# Patient Record
Sex: MALE | Race: WHITE | URBAN - METROPOLITAN AREA
[De-identification: names, ages, dates, MRNs, and addresses within clinical notes are randomized per-mention and may not be internally consistent; named-entity substitution may affect disease eponyms.]

---

## 2021-08-12 NOTE — PATIENT DISCUSSION
[facility] Visit New Patient Note    REFERRING PROVIDER: Dr. Eliana Ruelas    CHIEF COMPLAINT: liver cysts    HPI: This is a 54 y.o. Black or  female referred for evaluation of liver cysts. Has been seen in past by me for same issue. Seen once 2/2015. She had recent u/s that showed stable liver cysts, normal size liver. Her lft's are completely normal. She has abd pain but does have GERD. Had EGD and evaluation with GI in past. EGD was normal. She is scheduled for repeat soon and f/u with GI. She is not taking Nexium daily, on prn. GI advised daily PPI as her abd pain resolved with this in past.     Denies symptoms of hepatic decompensation including jaundice, dark urine, hematemesis, melena, slowed mentation, abdominal distention, or lower extremity edema.     Lab Results   Component Value Date    ALT 15 05/25/2019    AST 21 05/25/2019    ALKPHOS 88 05/25/2019    BILITOT 0.3 05/25/2019    ALBUMIN 3.7 05/25/2019    INR 0.9 06/15/2011     04/15/2019         Review of patient's allergies indicates:   Allergen Reactions    Ibuprofen Rash         Current Outpatient Medications on File Prior to Visit   Medication Sig Dispense Refill    ascorbic acid, vitamin C, 500 mg Cap Take by mouth once daily.       cetirizine (ZYRTEC) 5 MG tablet Take 5 mg by mouth once daily.        esomeprazole (NEXIUM 24HR) 20 MG capsule Take by mouth as needed.       fish oil-dha-epa 1,200-144-216 mg Cap Take 1 tablet by mouth once daily.      gabapentin (NEURONTIN) 100 MG capsule Take 1 capsule (100 mg total) by mouth 3 (three) times daily. (Patient taking differently: Take 100 mg by mouth as needed. ) 90 capsule 11    multivitamin (ONE DAILY MULTIVITAMIN) per tablet Take 1 tablet by mouth once daily.      TROLAMINE SALICYLATE (ASPERCREME TOP) Apply topically as needed.      cyclobenzaprine (FLEXERIL) 10 MG tablet Take 1 tablet (10 mg total) by mouth nightly as needed for Muscle spasms. May  The patient feels that the cataract is significantly impacting daily activities and has elected cataract surgery. The risks, benefits, and alternatives to surgery were discussed. The patient elects to proceed with surgery. "cause sleepiness , drowsiness. 90 tablet 2     No current facility-administered medications on file prior to visit.          PMHX:  has a past medical history of Allergy, Fibrocystic breast, Hypertension, Keloid cicatrix, Keloid scar, Liver cyst (2/3/2015), and Scoliosis of thoracolumbar spine (9/6/2016).    PSHX:  has a past surgical history that includes Colonoscopy; Hysterectomy (2011); and Breast biopsy.    FAMILY HISTORY: Negative for liver disease, reviewed in [employer].    SOCIAL HISTORY:   Social History     Tobacco Use   Smoking Status Never Smoker   Smokeless Tobacco Never Used   Tobacco Comment    Single.  .  Two kids.         Social History     Substance and Sexual Activity   Alcohol Use No    Alcohol/week: 0.0 oz    Comment: very rare       Social History     Substance and Sexual Activity   Drug Use No         ROS:   GENERAL: Denies fever, chills, weight loss/gain, fatigue  HEENT: Denies headaches, dizziness, vision/hearing changes  CARDIOVASCULAR: Denies chest pain, palpitations, or edema  RESPIRATORY: Denies dyspnea, cough  GI: (+) abdominal pain, no rectal bleeding, nausea, vomiting. No change in bowel pattern or color  : Denies dysuria, hematuria   SKIN: Denies rash, itching   NEURO: Denies confusion, memory loss, or mood changes  PSYCH: Denies depression or anxiety  HEME/LYMPH: Denies easy bruising or bleeding  MSK: Denies joint pain or myalgia.     PHYSICAL EXAM:   Friendly Black or  female, in no acute distress; alert and oriented to person, place and time  VITALS: BP (!) 153/81 (BP Location: Right arm, Patient Position: Sitting, BP Method: Medium (Automatic))   Pulse 101   Resp 16   Ht 5' 3" (1.6 m)   Wt 71.8 kg (158 lb 4.6 oz)   SpO2 100%   BMI 28.04 kg/m²   HENT: Normocephalic, without obvious abnormality. Oral mucosa pink and moist.   EYES: Sclerae anicteric.   NECK: Supple.  CARDIOVASCULAR: Regular rate and rhythm. No murmurs.  RESPIRATORY: Normal " respiratory effort. BBS CTA. No wheezes or crackles.  GI: Soft, non-tender, non-distended. No palpable hepatosplenomegaly. No masses palpable. No ascites.  EXTREMITIES:  No clubbing, cyanosis or edema.  SKIN: Warm and dry. No jaundice. No rashes noted to exposed skin. No telangectasias noted. No palmar erythema.  NEURO:  Normal gait.  PSYCH:  Memory intact. Thought and speech pattern appropriate. Behavior normal. No depression or anxiety noted.      LABS:  Lab Results   Component Value Date    WBC 4.78 04/15/2019    HGB 12.9 04/15/2019    HCT 39.9 04/15/2019     04/15/2019     04/15/2019    K 3.5 04/15/2019    CREATININE 0.8 04/15/2019    ALT 15 05/25/2019    AST 21 05/25/2019    ALKPHOS 88 05/25/2019    BILITOT 0.3 05/25/2019    BILIDIR 0.2 05/25/2019    ALBUMIN 3.7 05/25/2019    INR 0.9 06/15/2011    CHOL 226 (H) 04/15/2019    TRIG 69 04/15/2019    LDLCALC 139.2 04/15/2019    HGBA1C 5.1 04/15/2019       No results found for: HEPAIGG    No results found for: HEPBSAG  No results found for: HEPBCAB  No results found for: HEPBSAB  Hepatitis C Ab   Date Value Ref Range Status   11/04/2014 Negative  Final     Immunization History   Administered Date(s) Administered    Influenza 12/26/2012    Influenza - Quadrivalent 11/04/2014    Influenza A (H1N1) 2009 Monovalent - IM 12/03/2009    Influenza Split 12/26/2012    Pneumococcal Polysaccharide - 23 Valent 03/15/2019    Tdap 08/26/2008, 09/03/2013          DIAGNOSTIC STUDIES:  EGD- 1/22/16  Impression:           - Normal study, stomach biopsied to r/o H.pylori.    ABD. U/S- 5/25/19  FINDINGS:  Liver: Normal in size measuring 14.4 cm. There are innumerable liver cysts, largest in the left hepatic lobe demonstrates thin septation and measures 4.1 x 3.6 x 4.1 cm (previously 2.6 x 1.9 x 2.4 cm).  There is also a cyst with multiple thin septations in the right hepatic lobe measuring 2.0 x 1.9 x 1.9 cm (previously 2.1 x 2.2 x 2.7 cm).    Gallbladder: No calculi,  wall thickening, or pericholecystic fluid.  No sonographic Cornell's sign.    Biliary system: The common duct is not dilated, measuring 2 mm.  No intrahepatic ductal dilatation.    Spleen: Normal in size with a homogeneous echotexture, measuring 8.2 x 2.3 cm.    Pancreas: The visualized portions of pancreas appear normal.    Right kidney: Normal in size with no hydronephrosis, measuring 9.9 cm.    Left kidney:  Normal in size with no hydronephrosis, measuring 9.2 cm.    Aorta: No aneurysm.    Inferior vena cava: Normal in appearance.    Miscellaneous: No ascites.      Impression       No acute sonographic abnormality.    Innumerable hepatic cysts some with thin septation, similar to CT 02/02/2015.         ASSESSMENT:  54 y.o. Black or  female with:  1. Liver cysts  -- reassured her that her liver cysts are a benign incidental finding and even though she has multiple cysts noted, they are all overall stable and she does not need any further evaluation or f/u of this  -- reassured pt liver cysts were not causing her pain  2. Abdominal pain  -- recommend taking PPI daily and f/u with GI and EGD as scheduled      PLAN:  1. Reassured pt her liver cysts are not worrisome. No f/u imaging needed for these  2. Take PPI daily  3. F/u with GI and do EGD as scheduled  4. No f/u with hepatology needed      Thank you for allowing me to participate in the care of CECILLE Beard-STAR  [facility]         Copy note to:

## 2021-08-12 NOTE — PATIENT DISCUSSION
Patient advised of the right to post-operative care by the surgeon. Patient is fully informed of, and agreed to, co-management with their primary optometric physician. Post-operative care by the surgeon is not medically necessary and co-management is clinically appropriate. Patient has received itemization of fees related to cataract surgery. Transfer of care letter completed for the patient. Transfer care of Left eye to Dr. Tala Gilliam on 8/12/21. Patient instructed to call immediately if any new distortion, blurring, decreased vision or eye pain.

## 2021-08-16 ENCOUNTER — IMPORTED ENCOUNTER (OUTPATIENT)
Dept: URBAN - METROPOLITAN AREA CLINIC 9 | Facility: CLINIC | Age: 56
End: 2021-08-16

## 2021-08-19 NOTE — PATIENT DISCUSSION
Patient advised of the right to post-operative care by the surgeon. Patient is fully informed of, and agreed to, co-management with their primary optometric physician. Post-operative care by the surgeon is not medically necessary and co-management is clinically appropriate. Patient has received itemization of fees related to cataract surgery. Transfer of care letter completed for the patient. Transfer care of right eye to Dr. Jesus Gonzáles on 8/19/2021. Patient instructed to call immediately if any new distortion, blurring, decreased vision or eye pain.

## 2021-10-18 ASSESSMENT — VISUAL ACUITY
OS_SC: 20/80 SN
OD_CC: 20/20 SN
OD_SC: 20/100 SN
OS_CC: 20/20 SN

## 2021-10-18 ASSESSMENT — KERATOMETRY
OD_AXISANGLE_DEGREES: 180
OS_K2POWER_DIOPTERS: 45
OD_K2POWER_DIOPTERS: 44.75
OS_AXISANGLE_DEGREES: 180
OS_AXISANGLE2_DEGREES: 90
OD_K1POWER_DIOPTERS: 44.75
OS_K1POWER_DIOPTERS: 45
OD_AXISANGLE2_DEGREES: 90

## 2022-07-04 RX ORDER — SPIRONOLACTONE 25 MG/1
TABLET ORAL
COMMUNITY

## 2022-07-04 RX ORDER — PRASUGREL 10 MG/1
TABLET, FILM COATED ORAL
COMMUNITY

## 2022-07-04 RX ORDER — OXYCODONE HYDROCHLORIDE AND ACETAMINOPHEN 5; 325 MG/1; MG/1
TABLET ORAL
COMMUNITY

## 2022-07-04 RX ORDER — NITROGLYCERIN 0.4 MG/1
TABLET SUBLINGUAL
COMMUNITY

## 2022-07-04 RX ORDER — FLUTICASONE PROPIONATE 50 MCG
SPRAY, SUSPENSION (ML) NASAL
COMMUNITY

## 2022-07-04 RX ORDER — ISOSORBIDE MONONITRATE 30 MG/1
TABLET, EXTENDED RELEASE ORAL
COMMUNITY

## 2022-07-04 RX ORDER — METOPROLOL SUCCINATE 50 MG/1
TABLET, EXTENDED RELEASE ORAL
COMMUNITY

## 2022-07-04 RX ORDER — CETIRIZINE HYDROCHLORIDE 10 MG/1
TABLET ORAL
COMMUNITY

## 2022-07-04 RX ORDER — CLOPIDOGREL BISULFATE 75 MG/1
TABLET ORAL
COMMUNITY